# Patient Record
Sex: MALE | NOT HISPANIC OR LATINO | Employment: FULL TIME | URBAN - METROPOLITAN AREA
[De-identification: names, ages, dates, MRNs, and addresses within clinical notes are randomized per-mention and may not be internally consistent; named-entity substitution may affect disease eponyms.]

---

## 2023-04-14 ENCOUNTER — TELEPHONE (OUTPATIENT)
Dept: NEUROSURGERY | Facility: CLINIC | Age: 58
End: 2023-04-14
Payer: COMMERCIAL

## 2023-04-14 NOTE — TELEPHONE ENCOUNTER
Received [facility] referral, awaiting imaging and medical records.   PCP letter faxed.    Graciela Toscano, RN, CCM, CMSRN  RN Navigator  [facility]

## 2023-04-24 ENCOUNTER — TELEPHONE (OUTPATIENT)
Dept: NEUROSURGERY | Facility: CLINIC | Age: 58
End: 2023-04-24
Payer: COMMERCIAL

## 2023-04-24 NOTE — TELEPHONE ENCOUNTER
CLARISSA- spoke to patient and scheduled spine screen for 5/2 at 2pm. Will review records. PCP agreement still pending.    Graciela Toscano, RN, CCM, CMSRN  RN Navigator  [facility]

## 2023-05-02 ENCOUNTER — TELEPHONE (OUTPATIENT)
Dept: NEUROSURGERY | Facility: CLINIC | Age: 58
End: 2023-05-02
Payer: COMMERCIAL

## 2023-05-02 ENCOUNTER — PATIENT MESSAGE (OUTPATIENT)
Dept: NEUROSURGERY | Facility: CLINIC | Age: 58
End: 2023-05-02
Payer: COMMERCIAL

## 2023-05-02 RX ORDER — SACUBITRIL AND VALSARTAN 24; 26 MG/1; MG/1
1 TABLET, FILM COATED ORAL 2 TIMES DAILY
COMMUNITY
Start: 2023-04-22

## 2023-05-02 RX ORDER — CARVEDILOL 12.5 MG/1
12.5 TABLET ORAL 2 TIMES DAILY
COMMUNITY
Start: 2023-03-22

## 2023-05-02 RX ORDER — FUROSEMIDE 20 MG/1
20 TABLET ORAL 2 TIMES DAILY
COMMUNITY
Start: 2023-03-12

## 2023-05-02 NOTE — TELEPHONE ENCOUNTER
"Spoke to patient on:    Patient works at [employer]  Are you currently working? : Yes  - standing and walking- not a lot of heavy lifting  Are you on workers comp?No   Are you involved in any ligation at this time? No   Do you have HSA insurance? No                Have support to help with care and appointments: No   Travel Caregiver:         What is your chief complaint? injured back about 15 years ago- had steroid injections- about a year ago my sciatic nerve - left side started flaring up- OK when sitting or     Mom has implant in back and has Degenerative disk disease- both mother and father    How long has it been going on? year and half- last 6 months    Have you had pain like this before?No     Have you sought treatment for this condition in the past?Yes- JACK 10 years ago   If yes, what treatments did you then?  If yes, when did those stop working?    Where is the pain the worst?     Does the pain radiate?  left buttock- down leg side to knee down outside of leg to ankle (stops a foot)    Where else do you hurt? left buttock down leg    Is the pain constant or does it go away to a 0/10 at times even if the pain comes right back? starts at a 7     What makes the pain worse? standing or walking    What makes the pain better or decreases the pain (which position is least uncomfortable)? sitting down    Any other symptoms? no    Any bowel or bladder incontinence? no  Or changes?    What treatments have you tried for your current pain, and did they help?   Physical therapy? no   Chiropractor? yes- recent- didn' help   Injections? What kinds- JACK 10 years ago - it was lower back   Prior back surgery? no   Medication? tried tylenol or ibuprofen- uses lidocaine patch      BMI: 24.4  Ht: 6'0"  Weight: 180 pounds    AIC: No    Social Hx:     -  Nicotine no   - Alcohol yes- 1 drink a day   - Other substances no              Allergies:   Review of patient's allergies indicates:  No Known " Allergies      Medication list:  Current Outpatient Medications on File Prior to Visit   Medication Sig Dispense Refill    carvediloL (COREG) 12.5 MG tablet Take 12.5 mg by mouth 2 (two) times daily.      ENTRESTO 24-26 mg per tablet Take 1 tablet by mouth 2 (two) times daily.      furosemide (LASIX) 20 MG tablet Take 20 mg by mouth 2 (two) times daily.       No current facility-administered medications on file prior to visit.            Covid-19:   - vaccine: yes   - diagnosed with: no      Health Hx:  MARLEN- uses CPAP everyday  Cardiomyopathy    Surgical Hx:   No surgeries    Can you take one flight of stairs: yes    RCRI:   - Coronary Artery Disease: yes- cardiomyapathy   - Congestive Heart Failure: no   - Cerebrovascular Disease: no   - Diabetes Mellitus on insulin: no        STOPBANG   - S  snore loudly yes   - T  tired during the day no   - O  stop breathing in sleep yes   - P  BP/HTN yes   - B  BMI    - A  over 50 Yes and No   - N  neck size   - G  male gender Yes    Uses CPAP daily    Images Received:  MRI Yes    Type: Lumbar MRI 3/23- uploaded in EPIC  Xray Yes   Type:  EMG  No       Surgery recommended: No - has NOT seen a surgeon    PCP Information: Dr. Karen Montoya ph [phone]/fax [phone]- faxed - have not received yet.    Spoke with pt, reviewed history, discussed timeframe and expectation regarding the [facility] program, discussed logging in to the [facility] portal and to expect link to complete online seminar, confirmed PCP. Explained I would be sending imaging, chart review and spine screen assessment to Dr. Cuello/Cynthia then to Dr. Lanza to see if any additional orders are needed other than CXR and non bundled labs and I would reach back out when this information if obtained.  Explained that initial trip is an evaluation visit and if surgery needed and determined a surgery date would be determined with surgeon and patient. From there, I would send preop orders to PCP for them to  complete at home.   Patient verbalized understanding of the above and instructed to reach out with any questions or concerns. Direct phone # given and explained the use of patient portal communication.      Once PCP letter received, can schedule initial evaluation.    Graciela Toscano, RN, CMSRN  RN Navigator  [facility]

## 2023-05-03 ENCOUNTER — TELEPHONE (OUTPATIENT)
Dept: NEUROSURGERY | Facility: CLINIC | Age: 58
End: 2023-05-03
Payer: COMMERCIAL

## 2023-05-03 NOTE — TELEPHONE ENCOUNTER
Received call from patient that his primary care was hesitant about signing PCP agreement. Called Dr. Karen Montoya and spoke to her RN and explained [facility] program and PCP requirements. She stated Dr. Spivey was going to decline to sign form. Asked her to fax back. Will email [facility] to assist patient in finding a PCP to agree to the program.    Graciela Toscano, RN, CMSRN  RN Navigator  [facility]

## 2023-05-10 ENCOUNTER — PATIENT MESSAGE (OUTPATIENT)
Dept: NEUROSURGERY | Facility: CLINIC | Age: 58
End: 2023-05-10
Payer: COMMERCIAL

## 2023-05-24 ENCOUNTER — PATIENT MESSAGE (OUTPATIENT)
Dept: NEUROSURGERY | Facility: CLINIC | Age: 58
End: 2023-05-24
Payer: COMMERCIAL

## 2023-06-01 ENCOUNTER — PATIENT MESSAGE (OUTPATIENT)
Dept: NEUROSURGERY | Facility: CLINIC | Age: 58
End: 2023-06-01
Payer: COMMERCIAL

## 2023-06-05 ENCOUNTER — TELEPHONE (OUTPATIENT)
Dept: NEUROSURGERY | Facility: CLINIC | Age: 58
End: 2023-06-05
Payer: COMMERCIAL

## 2023-06-05 DIAGNOSIS — M54.9 DORSALGIA: Primary | ICD-10-CM

## 2023-06-23 ENCOUNTER — TELEPHONE (OUTPATIENT)
Dept: PAIN MEDICINE | Facility: CLINIC | Age: 58
End: 2023-06-23
Payer: COMMERCIAL

## 2023-06-23 NOTE — PROGRESS NOTES
Subjective:     Patient ID: Derick Conn is a 58 y.o. male.    Chief Complaint: No chief complaint on file.    Mr Conn is a 59 yo male CL[facility] patient here for evaluation of low back and left leg pain.  The pain has been present for a couple of years but worse since 10/2022.  The pain has progressively gotten worse.  The pain is worse in the left outer thigh.  The pain is not constant.  It is better with sitting and squatting.  The pain does not go away the longer he is on his feet.  The pain is worse with standing and walking.  The pain is a stabbing pain.  The pain is 3/10 now, worst 10/10 end of the day after standing all day, best 0/10 sitting in morning.  He has not been to PT for his back.  He has had injections but greater than 10 years ago.  He has not had recent injections.  He takes tylenol 2-3 pills a couple of times a week.  He uses a tens machine, OTC patches. He saw a chiropractor a couple fo visits with no relief    MRI lumbar 3/1/2023  SEGMENTATION: For the purposes of this dictation, it is assumed that  there are five fully segmented, non-rib-bearing lumbar-type vertebral  bodies, the lowest of which is labeled L5. Tip of the conus medullaris  is normal in position and configuration at T12-L1. The distal spinal  cord is normal in signal.  ALIGNMENT: There is a slight convex left curvature of the lumbar  spine. There is 2 mm retrolisthesis of L3 on L4, 3 mm retrolisthesis  of L4 on L5, and 2 mm retrolisthesis of L5 on S1.  BONE: Lumbar vertebral bodies are normal in height. There is no acute  fracture. There is moderate loss of disc height and disc desiccation  at L3-L4, L4-L5, and L5-S1. There is mild endplate marrow edema at  L3-L4, greatest on the right. There is faint endplate marrow edema  and mild fatty endplate changes at L4-L5 and there are mild to  moderate fatty endplate changes at L5-S1. These are likely stress  related.  EXTRA-AXIAL: There are no epidural collections.  OTHER: There is  no soft tissue abnormality in the limited images of  the abdomen and pelvis.  DEGENERATIVE:  L1-2: There is a mild disc bulge. There is mild bilateral facet  arthropathy. There is no central or neural foramina! narrowing.  L2-3: There is a mild disc bulge. There is mild bilateral facet  arthropathy. There is no central or neural foraminal narrowing.  L3-4: There is a moderate disc bulge in addition to the  retrolisthesis at this level. Endplate osteophytes are seen in the  neural foramen, right greater than left. There is mild bilateral  facet arthropathy. There is mild to moderate right and mild left  neural foraminal narrowing. There is mild to moderate central canal  stenosis.  L4-5: There is a moderate broad posterior disc osteophyte complex  which is asymmetric to the left in addition to the retrolisthesis at  this level. Endplate osteophytes are seen in the neural foramen  bilaterally. There is mild bilateral facet arthropathy. There is  mild to moderate left and mild right neural foraminal narrowing. There  is moderate central canal stenosis with effacement of the left lateral  recess and likely abutment and displacement of the left L5 nerve root.  L5-S1: There is a moderate broad posterior disc osteophyte complex.  Endplate osteophytes are seen in the neural foramen bilaterally.  There is mild to moderate left and mild right facet arthropathy.  There is moderate left and mild to moderate right neural foraminal  narrowing. There is abutment of the exiting left L5 nerve root and  questionable abutment of the right L5 nerve root There is no central  canal stenosis but There is slight narrowing of the left lateral  recess and questionable abutment of the left Si nerve root.  IMPRESSION:  1. Mild to moderate central canal stenosis with mild to moderate  right and mild left neural foraminal narrowing at L3-L4.  2. Moderate central canal stenosis with mild to moderate left and  mild right neural foramina! narrowing  at L4-L5. There is also  narrowing of the left lateral recess and abutment and likely  displacement of the left L5 nerve root.  3. Moderate left and mild to moderate right neural foraminal  narrowing at L5-S1. There is abutment of the exiting left L5 nerve  root and questionable abutment of the right L5 nerve root. There is  also slight narrowing of the left lateral recess and questionable  abutment of the left S1 nerve root.  4. Slight convex left curvature of the lumbar spine with multilevel  spondylolisthesis as above.  5. Mild endplate marrow edema at L3-L4, faint fatty endplate changes  at L4-L5, and mild to moderate fatty endplate changes at L5-S1 These  are likely stress related. There is no acute fracture.    No past medical history on file.    No past surgical history on file.    No family history on file.      Social History    Socioeconomic History      Marital status:     Tobacco Use      Smoking status: Former        Types: Cigarettes      Current Outpatient Medications:  carvediloL (COREG) 12.5 MG tablet, Take 12.5 mg by mouth 2 (two) times daily., Disp: , Rfl:   ENTRESTO 24-26 mg per tablet, Take 1 tablet by mouth 2 (two) times daily., Disp: , Rfl:   furosemide (LASIX) 20 MG tablet, Take 20 mg by mouth 2 (two) times daily., Disp: , Rfl:     No current facility-administered medications for this visit.      Review of patient's allergies indicates:  No Known Allergies      Review of Systems   Constitutional: Negative for weight gain and weight loss.   Cardiovascular:  Negative for chest pain.   Respiratory:  Negative for shortness of breath.    Musculoskeletal:  Positive for back pain. Negative for joint pain and joint swelling.   Gastrointestinal:  Negative for abdominal pain, bowel incontinence, nausea and vomiting.   Genitourinary:  Negative for bladder incontinence.   Neurological:  Positive for paresthesias (left foot toward heel). Negative for numbness.      Objective:     General: Derick lala  well-developed, well-nourished, appears stated age, in no acute distress, alert and oriented to time, place and person.     General    Vitals reviewed.  Constitutional: He is oriented to person, place, and time. He appears well-developed and well-nourished.   HENT:   Head: Normocephalic and atraumatic.   Pulmonary/Chest: Effort normal.   Neurological: He is alert and oriented to person, place, and time.   Psychiatric: He has a normal mood and affect. His behavior is normal. Judgment and thought content normal.     General Musculoskeletal Exam   Gait: normal     Right Ankle/Foot Exam     Tests   Heel Walk: able to perform  Tiptoe Walk: able to perform    Left Ankle/Foot Exam     Tests   Heel Walk: able to perform  Tiptoe Walk: able to perform  Back (L-Spine & T-Spine) / Neck (C-Spine) Exam     Back (L-Spine & T-Spine) Range of Motion   Extension:  30   Flexion:  90   Lateral bend right:  20   Lateral bend left:  20   Rotation right:  40   Rotation left:  40     Spinal Sensation   Right Side Sensation  C-Spine Level: normal   L-Spine Level: normal  S-Spine Level: normal  Left Side Sensation  C-Spine Level: normal  L-Spine Level: normal  S-Spine Level: normal    Back (L-Spine & T-Spine) Tests   Right Side Tests  Straight leg raise:        Sitting SLR: > 70 degrees    Left Side Tests  Straight leg raise:       Sitting SLR: > 70 degrees      Other   He has no scoliosis .  Spinal Kyphosis:  Absent      Muscle Strength   Right Upper Extremity   Biceps: 5/5   Deltoid:  5/5  Triceps:  5/5  Wrist extension: 5/5   Finger Flexors:  5/5  Left Upper Extremity  Biceps: 5/5   Deltoid:  5/5  Triceps:  5/5  Wrist extension: 5/5   Finger Flexors:  5/5  Right Lower Extremity   Hip Flexion: 5/5   Quadriceps:  5/5   Anterior tibial:  5/5   EHL:  5/5  Left Lower Extremity   Hip Flexion: 5/5   Quadriceps:  5/5   Anterior tibial:  5/5   EHL:  5/5    Reflexes     Left Side  Biceps:  2+  Triceps:  2+  Brachioradialis:  2+  Achilles:  2+  Left  Collins's Sign:  Absent  Babinski Sign:  absent  Quadriceps:  2+    Right Side   Biceps:  2+  Triceps:  2+  Brachioradialis:  2+  Achilles:  2+  Right Collins's Sign:  absent  Babinski Sign:  absent  Quadriceps:  2+    Vascular Exam     Right Pulses        Carotid:                  2+    Left Pulses        Carotid:                  2+        Assessment:     1. DDD (degenerative disc disease), lumbar    2. Lumbar radiculopathy         Plan:     Orders Placed This Encounter    [facility]     We discussed back pain and the nature of back pain.  We discussed that it is not one thing that causes the pain but an accumulation of multiple things that we do.  Pain is leg dominant.  MRI was reviewed, he has not had recent conservative care  We discussed posture sitting and the importance of trying to sit better.  We discussed sitting up and taking standing breaks.  His pain is more at end of day after standing all day  We discussed the benefits of therapy and exercise and continuing to move.  We discussed the importance of exercise and PT can help the pain and to keep doing exercises  Healthy back lumbar pattern 4 PT eval  Treat your own back book  Pain management recommended left L4/5 and L5/S1 TFESI       Follow-up: No follow-ups on file. If there are any questions prior to this, the patient was instructed to contact the office.

## 2023-06-23 NOTE — H&P (VIEW-ONLY)
Subjective:     Patient ID: Derick Conn is a 58 y.o. male.    Chief Complaint: No chief complaint on file.    Mr Conn is a 59 yo male [facility] patient here for evaluation of low back and left leg pain.  The pain has been present for a couple of years but worse since 10/2022.  The pain has progressively gotten worse.  The pain is worse in the left outer thigh.  The pain is not constant.  It is better with sitting and squatting.  The pain does not go away the longer he is on his feet.  The pain is worse with standing and walking.  The pain is a stabbing pain.  The pain is 3/10 now, worst 10/10 end of the day after standing all day, best 0/10 sitting in morning.  He has not been to PT for his back.  He has had injections but greater than 10 years ago.  He has not had recent injections.  He takes tylenol 2-3 pills a couple of times a week.  He uses a tens machine, OTC patches. He saw a chiropractor a couple fo visits with no relief    MRI lumbar 3/1/2023  SEGMENTATION: For the purposes of this dictation, it is assumed that  there are five fully segmented, non-rib-bearing lumbar-type vertebral  bodies, the lowest of which is labeled L5. Tip of the conus medullaris  is normal in position and configuration at T12-L1. The distal spinal  cord is normal in signal.  ALIGNMENT: There is a slight convex left curvature of the lumbar  spine. There is 2 mm retrolisthesis of L3 on L4, 3 mm retrolisthesis  of L4 on L5, and 2 mm retrolisthesis of L5 on S1.  BONE: Lumbar vertebral bodies are normal in height. There is no acute  fracture. There is moderate loss of disc height and disc desiccation  at L3-L4, L4-L5, and L5-S1. There is mild endplate marrow edema at  L3-L4, greatest on the right. There is faint endplate marrow edema  and mild fatty endplate changes at L4-L5 and there are mild to  moderate fatty endplate changes at L5-S1. These are likely stress  related.  EXTRA-AXIAL: There are no epidural collections.  OTHER: There is  no soft tissue abnormality in the limited images of  the abdomen and pelvis.  DEGENERATIVE:  L1-2: There is a mild disc bulge. There is mild bilateral facet  arthropathy. There is no central or neural foramina! narrowing.  L2-3: There is a mild disc bulge. There is mild bilateral facet  arthropathy. There is no central or neural foraminal narrowing.  L3-4: There is a moderate disc bulge in addition to the  retrolisthesis at this level. Endplate osteophytes are seen in the  neural foramen, right greater than left. There is mild bilateral  facet arthropathy. There is mild to moderate right and mild left  neural foraminal narrowing. There is mild to moderate central canal  stenosis.  L4-5: There is a moderate broad posterior disc osteophyte complex  which is asymmetric to the left in addition to the retrolisthesis at  this level. Endplate osteophytes are seen in the neural foramen  bilaterally. There is mild bilateral facet arthropathy. There is  mild to moderate left and mild right neural foraminal narrowing. There  is moderate central canal stenosis with effacement of the left lateral  recess and likely abutment and displacement of the left L5 nerve root.  L5-S1: There is a moderate broad posterior disc osteophyte complex.  Endplate osteophytes are seen in the neural foramen bilaterally.  There is mild to moderate left and mild right facet arthropathy.  There is moderate left and mild to moderate right neural foraminal  narrowing. There is abutment of the exiting left L5 nerve root and  questionable abutment of the right L5 nerve root There is no central  canal stenosis but There is slight narrowing of the left lateral  recess and questionable abutment of the left Si nerve root.  IMPRESSION:  1. Mild to moderate central canal stenosis with mild to moderate  right and mild left neural foraminal narrowing at L3-L4.  2. Moderate central canal stenosis with mild to moderate left and  mild right neural foramina! narrowing  at L4-L5. There is also  narrowing of the left lateral recess and abutment and likely  displacement of the left L5 nerve root.  3. Moderate left and mild to moderate right neural foraminal  narrowing at L5-S1. There is abutment of the exiting left L5 nerve  root and questionable abutment of the right L5 nerve root. There is  also slight narrowing of the left lateral recess and questionable  abutment of the left S1 nerve root.  4. Slight convex left curvature of the lumbar spine with multilevel  spondylolisthesis as above.  5. Mild endplate marrow edema at L3-L4, faint fatty endplate changes  at L4-L5, and mild to moderate fatty endplate changes at L5-S1 These  are likely stress related. There is no acute fracture.    No past medical history on file.    No past surgical history on file.    No family history on file.      Social History    Socioeconomic History      Marital status:     Tobacco Use      Smoking status: Former        Types: Cigarettes      Current Outpatient Medications:  carvediloL (COREG) 12.5 MG tablet, Take 12.5 mg by mouth 2 (two) times daily., Disp: , Rfl:   ENTRESTO 24-26 mg per tablet, Take 1 tablet by mouth 2 (two) times daily., Disp: , Rfl:   furosemide (LASIX) 20 MG tablet, Take 20 mg by mouth 2 (two) times daily., Disp: , Rfl:     No current facility-administered medications for this visit.      Review of patient's allergies indicates:  No Known Allergies      Review of Systems   Constitutional: Negative for weight gain and weight loss.   Cardiovascular:  Negative for chest pain.   Respiratory:  Negative for shortness of breath.    Musculoskeletal:  Positive for back pain. Negative for joint pain and joint swelling.   Gastrointestinal:  Negative for abdominal pain, bowel incontinence, nausea and vomiting.   Genitourinary:  Negative for bladder incontinence.   Neurological:  Positive for paresthesias (left foot toward heel). Negative for numbness.      Objective:     General: Derick lala  well-developed, well-nourished, appears stated age, in no acute distress, alert and oriented to time, place and person.     General    Vitals reviewed.  Constitutional: He is oriented to person, place, and time. He appears well-developed and well-nourished.   HENT:   Head: Normocephalic and atraumatic.   Pulmonary/Chest: Effort normal.   Neurological: He is alert and oriented to person, place, and time.   Psychiatric: He has a normal mood and affect. His behavior is normal. Judgment and thought content normal.     General Musculoskeletal Exam   Gait: normal     Right Ankle/Foot Exam     Tests   Heel Walk: able to perform  Tiptoe Walk: able to perform    Left Ankle/Foot Exam     Tests   Heel Walk: able to perform  Tiptoe Walk: able to perform  Back (L-Spine & T-Spine) / Neck (C-Spine) Exam     Back (L-Spine & T-Spine) Range of Motion   Extension:  30   Flexion:  90   Lateral bend right:  20   Lateral bend left:  20   Rotation right:  40   Rotation left:  40     Spinal Sensation   Right Side Sensation  C-Spine Level: normal   L-Spine Level: normal  S-Spine Level: normal  Left Side Sensation  C-Spine Level: normal  L-Spine Level: normal  S-Spine Level: normal    Back (L-Spine & T-Spine) Tests   Right Side Tests  Straight leg raise:        Sitting SLR: > 70 degrees    Left Side Tests  Straight leg raise:       Sitting SLR: > 70 degrees      Other   He has no scoliosis .  Spinal Kyphosis:  Absent      Muscle Strength   Right Upper Extremity   Biceps: 5/5   Deltoid:  5/5  Triceps:  5/5  Wrist extension: 5/5   Finger Flexors:  5/5  Left Upper Extremity  Biceps: 5/5   Deltoid:  5/5  Triceps:  5/5  Wrist extension: 5/5   Finger Flexors:  5/5  Right Lower Extremity   Hip Flexion: 5/5   Quadriceps:  5/5   Anterior tibial:  5/5   EHL:  5/5  Left Lower Extremity   Hip Flexion: 5/5   Quadriceps:  5/5   Anterior tibial:  5/5   EHL:  5/5    Reflexes     Left Side  Biceps:  2+  Triceps:  2+  Brachioradialis:  2+  Achilles:  2+  Left  Collins's Sign:  Absent  Babinski Sign:  absent  Quadriceps:  2+    Right Side   Biceps:  2+  Triceps:  2+  Brachioradialis:  2+  Achilles:  2+  Right Collins's Sign:  absent  Babinski Sign:  absent  Quadriceps:  2+    Vascular Exam     Right Pulses        Carotid:                  2+    Left Pulses        Carotid:                  2+        Assessment:     1. DDD (degenerative disc disease), lumbar    2. Lumbar radiculopathy         Plan:     Orders Placed This Encounter    [facility]     We discussed back pain and the nature of back pain.  We discussed that it is not one thing that causes the pain but an accumulation of multiple things that we do.  Pain is leg dominant.  MRI was reviewed, he has not had recent conservative care  We discussed posture sitting and the importance of trying to sit better.  We discussed sitting up and taking standing breaks.  His pain is more at end of day after standing all day  We discussed the benefits of therapy and exercise and continuing to move.  We discussed the importance of exercise and PT can help the pain and to keep doing exercises  Healthy back lumbar pattern 4 PT eval  Treat your own back book  Pain management recommended left L4/5 and L5/S1 TFESI       Follow-up: No follow-ups on file. If there are any questions prior to this, the patient was instructed to contact the office.

## 2023-06-26 ENCOUNTER — HOSPITAL ENCOUNTER (OUTPATIENT)
Dept: RADIOLOGY | Facility: OTHER | Age: 58
Discharge: HOME | End: 2023-06-26
Attending: NEUROLOGICAL SURGERY
Payer: COMMERCIAL

## 2023-06-26 ENCOUNTER — OFFICE VISIT (OUTPATIENT)
Dept: SPINE | Facility: CLINIC | Age: 58
End: 2023-06-26
Payer: COMMERCIAL

## 2023-06-26 ENCOUNTER — CLINICAL SUPPORT (OUTPATIENT)
Dept: REHABILITATION | Facility: OTHER | Age: 58
End: 2023-06-26
Attending: NEUROLOGICAL SURGERY
Payer: COMMERCIAL

## 2023-06-26 ENCOUNTER — OFFICE VISIT (OUTPATIENT)
Dept: SPINE | Facility: CLINIC | Age: 58
End: 2023-06-26
Attending: PHYSICAL MEDICINE & REHABILITATION
Payer: COMMERCIAL

## 2023-06-26 ENCOUNTER — OFFICE VISIT (OUTPATIENT)
Dept: PAIN MEDICINE | Facility: CLINIC | Age: 58
End: 2023-06-26
Attending: ANESTHESIOLOGY
Payer: COMMERCIAL

## 2023-06-26 VITALS
BODY MASS INDEX: 24.28 KG/M2 | TEMPERATURE: 98 F | WEIGHT: 179.25 LBS | RESPIRATION RATE: 19 BRPM | HEIGHT: 72 IN | HEART RATE: 63 BPM | SYSTOLIC BLOOD PRESSURE: 148 MMHG | DIASTOLIC BLOOD PRESSURE: 97 MMHG

## 2023-06-26 VITALS — HEART RATE: 110 BPM | SYSTOLIC BLOOD PRESSURE: 161 MMHG | DIASTOLIC BLOOD PRESSURE: 86 MMHG

## 2023-06-26 DIAGNOSIS — R29.898 DECREASED STRENGTH OF TRUNK AND BACK: ICD-10-CM

## 2023-06-26 DIAGNOSIS — M51.36 DDD (DEGENERATIVE DISC DISEASE), LUMBAR: ICD-10-CM

## 2023-06-26 DIAGNOSIS — M54.16 LUMBAR RADICULOPATHY: ICD-10-CM

## 2023-06-26 DIAGNOSIS — R29.3 POOR POSTURE: ICD-10-CM

## 2023-06-26 DIAGNOSIS — M48.062 SPINAL STENOSIS OF LUMBAR REGION WITH NEUROGENIC CLAUDICATION: Primary | ICD-10-CM

## 2023-06-26 DIAGNOSIS — M54.9 DORSALGIA: ICD-10-CM

## 2023-06-26 DIAGNOSIS — M51.36 DDD (DEGENERATIVE DISC DISEASE), LUMBAR: Primary | ICD-10-CM

## 2023-06-26 DIAGNOSIS — M54.16 LUMBAR RADICULOPATHY: Primary | ICD-10-CM

## 2023-06-26 PROCEDURE — 99999 PR PBB SHADOW E&M-EST. PATIENT-LVL III: ICD-10-PCS | Mod: PBBFAC,COE,, | Performed by: NEUROLOGICAL SURGERY

## 2023-06-26 PROCEDURE — 99999 PR PBB SHADOW E&M-EST. PATIENT-LVL III: CPT | Mod: PBBFAC,COE,, | Performed by: ANESTHESIOLOGY

## 2023-06-26 PROCEDURE — 72110 X-RAY EXAM L-2 SPINE 4/>VWS: CPT | Mod: TC,FY

## 2023-06-26 PROCEDURE — 97161 PT EVAL LOW COMPLEX 20 MIN: CPT

## 2023-06-26 PROCEDURE — 99203 OFFICE O/P NEW LOW 30 MIN: CPT | Mod: S$GLB,COE,, | Performed by: PHYSICAL MEDICINE & REHABILITATION

## 2023-06-26 PROCEDURE — 99203 PR OFFICE/OUTPT VISIT, NEW, LEVL III, 30-44 MIN: ICD-10-PCS | Mod: S$GLB,COE,, | Performed by: PHYSICAL MEDICINE & REHABILITATION

## 2023-06-26 PROCEDURE — 97110 THERAPEUTIC EXERCISES: CPT

## 2023-06-26 PROCEDURE — 99999 PR PBB SHADOW E&M-EST. PATIENT-LVL II: CPT | Mod: PBBFAC,COE,, | Performed by: PHYSICAL MEDICINE & REHABILITATION

## 2023-06-26 PROCEDURE — 99999 PR PBB SHADOW E&M-EST. PATIENT-LVL III: CPT | Mod: PBBFAC,COE,, | Performed by: NEUROLOGICAL SURGERY

## 2023-06-26 PROCEDURE — 99999 PR PBB SHADOW E&M-EST. PATIENT-LVL II: ICD-10-PCS | Mod: PBBFAC,COE,, | Performed by: PHYSICAL MEDICINE & REHABILITATION

## 2023-06-26 PROCEDURE — 99203 OFFICE O/P NEW LOW 30 MIN: CPT | Mod: S$GLB,COE,, | Performed by: NEUROLOGICAL SURGERY

## 2023-06-26 PROCEDURE — 99204 PR OFFICE/OUTPT VISIT, NEW, LEVL IV, 45-59 MIN: ICD-10-PCS | Mod: S$GLB,COE,, | Performed by: ANESTHESIOLOGY

## 2023-06-26 PROCEDURE — 99204 OFFICE O/P NEW MOD 45 MIN: CPT | Mod: S$GLB,COE,, | Performed by: ANESTHESIOLOGY

## 2023-06-26 PROCEDURE — 99203 PR OFFICE/OUTPT VISIT, NEW, LEVL III, 30-44 MIN: ICD-10-PCS | Mod: S$GLB,COE,, | Performed by: NEUROLOGICAL SURGERY

## 2023-06-26 PROCEDURE — 99999 PR PBB SHADOW E&M-EST. PATIENT-LVL III: ICD-10-PCS | Mod: PBBFAC,COE,, | Performed by: ANESTHESIOLOGY

## 2023-06-26 PROCEDURE — 72110 XR LUMBAR SPINE AP AND LAT WITH FLEX/EXT: ICD-10-PCS | Mod: 26,COE,, | Performed by: RADIOLOGY

## 2023-06-26 PROCEDURE — 72110 X-RAY EXAM L-2 SPINE 4/>VWS: CPT | Mod: 26,COE,, | Performed by: RADIOLOGY

## 2023-06-26 NOTE — PROGRESS NOTES
"CHIEF COMPLAINT:  Consolation with possible surgical intervention    I, Nuvia Ortez, attest that this documentation has been prepared under the direction and in the presence of Atul Cuello MD.    HPI:  Derick Conn is a 58 y.o.  male with PMHx of MARLEN and cardiomyopathy, who presents to clinic today for consultation of possible surgical intervention. He is a [facility] [place]. Patient reports injured back about 15 years ago, has had conservative treatment with steroid injections, and noted flaring up of left sided sciatic nerve pain 1 year ago. He reports his pain radiates to his left buttock, down his leg side to knee and to outside of leg to ankle. He described his pain as a 7/10 worse with ambulation or standing, and alleviation of pain when sitting. He denied b/b dysfunction. He endorsed daily EtOH usage.     Today the patient reports continued right leg pain since October with lower back pain that he described as a "stiffness" that is worsened with standing and walking. He states his symptoms are better when he leans forward. Denies bladder dysfunction or balance problems. He reports that he has not sought conservative treatment. He notes that he is usually on his feet for 12 hours a day.         Review of patient's allergies indicates:  No Known Allergies    No past medical history on file.  No past surgical history on file.  No family history on file.  Social History     Tobacco Use    Smoking status: Former     Types: Cigarettes        Review of Systems   Constitutional: Negative.    HENT: Negative.     Eyes: Negative.    Respiratory: Negative.     Cardiovascular: Negative.    Gastrointestinal: Negative.    Endocrine: Negative.    Genitourinary: Negative.    Musculoskeletal:  Positive for back pain and myalgias (left leg). Negative for gait problem and neck pain.   Skin: Negative.    Allergic/Immunologic: Negative.    Neurological:  Negative for weakness, light-headedness, numbness and headaches. "   Hematological: Negative.    Psychiatric/Behavioral: Negative.     All other systems reviewed and are negative.    OBJECTIVE:   Vital Signs:  Pulse: (!) 115 (06/26/23 1042)  BP: (!) 161/86 (06/26/23 1042)    Physical Exam:    Vital signs: All nursing notes and vital signs reviewed -- afebrile, vital signs stable.  Constitutional: Patient sitting comfortably in chair. Appears well developed and well nourished.  Skin: Exposed areas are intact without abnormal markings, rashes or other lesions.  HEENT: Normocephalic. Normal conjunctivae.  Cardiovascular: Normal rate and regular rhythm.  Respiratory: Chest wall rises and falls symmetrically, without signs of respiratory distress.  Abdomen: Soft and non-tender.  Extremities: Warm and without edema. Calves supple, non-tender.  Psych/Behavior: Normal affect.    Neurological:    Mental status: Alert and oriented. Conversational and appropriate.       Cranial Nerves: VFF to confrontation. PERRL. EOMI without nystagmus. Facial STLT normal and symmetric. Strong, symmetric muscles of mastication. Facial strength full and symmetric. Hearing equal bilaterally to finger rub. Palate and uvula rise and fall normally in midline. Shoulder shrug 5/5 strength. Tongue midline.     Motor:    Upper:  Deltoids Triceps Biceps WE WF     R 5/5 5/5 5/5 5/5 5/5 5/5    L 5/5 5/5 5/5 5/5 5/5 5/5      Lower:  HF KE KF DF PF EHL    R 5/5 5/5 5/5 5/5 5/5 5/5    L 5/5 5/5 5/5 5/5 5/5 5/5     Sensory: Intact sensation to light touch in all extremities. Romberg negative.    Reflexes:          DTR: 2+ symmetrically throughout.     Collins's: Negative.     Babinski's: Negative.     Clonus: Negative.    Cerebellar: Finger-to-nose and rapid alternating movements normal. Gait stable, fluid.    Spine:    Posture: Head well aligned over pelvis in front and side views.  No focal or global spinal deformity visible on inspection. Shoulders and hips even. No obvious leg length discrepancy. No scapula  winging.    Bending: Full ROM with forward, back and lateral bending. No rib prominence with forward bend.    Cervical:      ROM: Full with flexion, extension, lateral rotation and ear-to-shoulder bend.      Midline TTP: Negative.     Spurling's test: Negative.     Lhermitte's: Negative.    Thoracic:     Midline TTP: Negative    Lumbar:     Midline TTP: Negative     Straight Leg Test: Negative     Crossed Straight Leg Test: Negative     Sciatic notch tenderness: Negative.    Other:     SI joint TTP: Negative.     Greater trochanter TTP: Negative.     Tenderness with external/internal hip rotation: Negative.    Diagnostic Results:  All imaging was independently reviewed by me.    X-Ray Lumbar Spine With Flex-Ext dated 6/26/23  No instability    MRI Lumbar Spine dated 4/19/23  Degenerative disc at L3/4, L4/5, L5/S1 with severe nerve foraminal stenosis left more than right     ASSESSMENT/PLAN:     Derick Conn has degenerative disc disease at L3-4 through L5-S1 with multilevel foraminal stenosis, causing axial low back pain and neurogenic claudication. He may be a surgical candidate down the road but first needs to try nonsurgical things like PT and injections. Any future surgical reevaluation should include a scoli xray to see if fusion would be needed.    The patient understands and agrees with the plan of care. All questions were answered.     1. PT and PM      I, Dr. Atul Cuello personally performed the services described in this documentation. All medical record entries made by the scribe, Nuvia Ortez, were at my direction and in my presence.  I have reviewed the chart and agree that the record reflects my personal performance and is accurate and complete.      Atul Cuello M.D.  Department of Neurosurgery  [facility]

## 2023-06-26 NOTE — PROGRESS NOTES
Chronic Pain - New Consult    Referring Physician: Atul Cuello MD    Chief Complaint:   Chief Complaint   Patient presents with    Low-back Pain     SUBJECTIVE:    Derick Conn presents to the clinic for the evaluation of low back pain radiating into the left leg pain.  He has had low back pain for many years, but his current symptoms started about 1 year ago, and symptoms have been worsening.The pain is located in the low back and radiates down the posterior lateral aspect of his left leg.  The pain is described as sharp and shooting and is rated as 4/10. The pain is rated with a score of  4/10 on the BEST day and a score of 10/10 on the WORST day.  Symptoms interfere with daily activity, sleeping, and work. The pain is exacerbated by Standing, Walking, and Night Time.  The pain is mitigated by rest, sitting, and TENS.     Patient denies night fever/night sweats, urinary incontinence, bowel incontinence, significant weight loss, significant motor weakness, and loss of sensations.    Physical Therapy/Home Exercise: yes      Pain Disability Index Review:  Last 3 PDI Scores 6/26/2023   Pain Disability Index (PDI) 37     Pain Medications:    - Adjuvant Medications: Mobic (Meloxicam)     report:  Not applicable    Pain Procedures:   Prior epidural injections approx. 10 years ago that were helpful    Imaging:   MRI L-spine - 3/1/23        No past medical history on file.  No past surgical history on file.  Social History     Socioeconomic History    Marital status:      No family history on file.    Review of patient's allergies indicates:  No Known Allergies    Current Outpatient Medications   Medication Sig    carvediloL (COREG) 12.5 MG tablet Take 12.5 mg by mouth 2 (two) times daily.    ENTRESTO 24-26 mg per tablet Take 1 tablet by mouth 2 (two) times daily.    furosemide (LASIX) 20 MG tablet Take 20 mg by mouth 2 (two) times daily.     No current facility-administered medications for this visit.        REVIEW OF SYSTEMS:    GENERAL:  No weight loss, malaise or fevers.  HEENT:  Negative for frequent or significant headaches.  NECK:  Negative for lumps, goiter, pain and significant neck swelling.  RESPIRATORY:  Negative for cough, wheezing or shortness of breath.  CARDIOVASCULAR:  Negative for chest pain, leg swelling or palpitations.  GI:  Negative for abdominal discomfort, blood in stools or black stools or change in bowel habits.  MUSCULOSKELETAL:  See HPI.  SKIN:  Negative for lesions, rash, and itching.  PSYCH:  Negative for sleep disturbance, mood disorder and recent psychosocial stressors.  HEMATOLOGY/LYMPHOLOGY:  Negative for prolonged bleeding, bruising easily or swollen nodes.  NEURO:   No history of headaches, syncope, paralysis, seizures or tremors.  All other reviewed and negative other than HPI.    OBJECTIVE:    BP (!) 148/97 (BP Location: Right arm, Patient Position: Sitting)   Pulse 63   Temp 97.5 °F (36.4 °C) (Oral)   Resp 19   Ht 6' (1.829 m)   Wt 81.3 kg (179 lb 3.7 oz)   BMI 24.31 kg/m²     PHYSICAL EXAMINATION:    General appearance: Well appearing, in no acute distress, alert and oriented x3.  Psych:  Mood and affect appropriate.  Skin: Skin color, texture, turgor normal, no rashes or lesions, in both upper and lower body.  Head/face:  Normocephalic, atraumatic. No palpable lymph nodes.  Cor: RRR  Pulm:  Symmetric chest rise.  Nonlabored breathing.  GI:  Nondistended  Back: Straight leg raising in the sitting and supine positions is +ve to radicular pain. No no focal tenderness to palpation of the spine or costovertebral angles. Normal range of motion.  Patient does report some discomfort with terminal extension.  Extremities: Peripheral joint ROM is full and pain free without obvious instability or laxity in all four extremities. No deformities, edema, or skin discoloration. Good capillary refill.  Musculoskeletal:  HOLA negative.  FADIR negative.  Bilateral upper and lower  extremity strength is normal and symmetric.  No atrophy or tone abnormalities are noted.  Neuro: Bilateral upper and lower extremity coordination and muscle stretch reflexes are physiologic and symmetric.  No clonus.  No loss of sensation is noted.  Gait: normal.    ASSESSMENT: 58 y.o. year old male with low back pain, consistent with      1. DDD (degenerative disc disease), lumbar  Procedure Order to Pain Management      2. Lumbar radiculopathy  Procedure Order to Pain Management        PLAN:     - I have stressed the importance of physical activity and a home exercise plan to help with pain and improve health.  - Patient can continue with medications for now since they are providing benefits, using them appropriately, and without side effects.  - Will schedule for left L4/5 and L5/S1 TFESI tomorrow  - Would recommend trial of gabapentin for nerve pain  - Continue TENS unit as this has been helpful for him  - Counseled patient regarding the importance of activity modification, constant sleeping habits, and physical therapy.      The above plan and management options were discussed at length with patient. Patient is in agreement with the above and verbalized understanding. It will be communicated with the referring physician via electronic record, fax, or mail.    Ryne Wang MD  [facility], PGY-4   I have personally reviewed the history and exam of this patient and agree with the resident/fellow/NPs note as stated above.    Abhijeet Gold MD  6/26/23

## 2023-06-27 ENCOUNTER — PATIENT MESSAGE (OUTPATIENT)
Dept: ADMINISTRATIVE | Facility: OTHER | Age: 58
End: 2023-06-27
Payer: COMMERCIAL

## 2023-06-27 ENCOUNTER — HOSPITAL ENCOUNTER (OUTPATIENT)
Facility: OTHER | Age: 58
Discharge: HOME | End: 2023-06-27
Attending: ANESTHESIOLOGY | Admitting: STUDENT IN AN ORGANIZED HEALTH CARE EDUCATION/TRAINING PROGRAM
Payer: COMMERCIAL

## 2023-06-27 VITALS
BODY MASS INDEX: 24.38 KG/M2 | TEMPERATURE: 98 F | HEART RATE: 64 BPM | OXYGEN SATURATION: 98 % | HEIGHT: 72 IN | SYSTOLIC BLOOD PRESSURE: 159 MMHG | WEIGHT: 180 LBS | DIASTOLIC BLOOD PRESSURE: 98 MMHG | RESPIRATION RATE: 16 BRPM

## 2023-06-27 DIAGNOSIS — G89.29 CHRONIC PAIN: ICD-10-CM

## 2023-06-27 DIAGNOSIS — M54.16 LUMBAR RADICULOPATHY: Primary | ICD-10-CM

## 2023-06-27 PROCEDURE — 64484 PRA INJECT ANES/STEROID FORAMEN LUMBAR/SACRAL W IMG GUIDE ,EA ADD LEVEL: ICD-10-PCS | Mod: 50,COE,, | Performed by: ANESTHESIOLOGY

## 2023-06-27 PROCEDURE — 64483 PR EPIDURAL INJ, ANES/STEROID, TRANSFORAMINAL, LUMB/SACR, SNGL LEVL: ICD-10-PCS | Mod: 50,COE,, | Performed by: ANESTHESIOLOGY

## 2023-06-27 PROCEDURE — 25000003 PHARM REV CODE 250: Performed by: ANESTHESIOLOGY

## 2023-06-27 PROCEDURE — 25000003 PHARM REV CODE 250: Performed by: STUDENT IN AN ORGANIZED HEALTH CARE EDUCATION/TRAINING PROGRAM

## 2023-06-27 PROCEDURE — 64484 NJX AA&/STRD TFRM EPI L/S EA: CPT | Mod: 50,COE,, | Performed by: ANESTHESIOLOGY

## 2023-06-27 PROCEDURE — 63600175 PHARM REV CODE 636 W HCPCS: Performed by: ANESTHESIOLOGY

## 2023-06-27 PROCEDURE — 25500020 PHARM REV CODE 255: Performed by: ANESTHESIOLOGY

## 2023-06-27 PROCEDURE — 64484 NJX AA&/STRD TFRM EPI L/S EA: CPT | Mod: LT | Performed by: ANESTHESIOLOGY

## 2023-06-27 PROCEDURE — 64483 NJX AA&/STRD TFRM EPI L/S 1: CPT | Mod: 50,COE,, | Performed by: ANESTHESIOLOGY

## 2023-06-27 PROCEDURE — 64483 NJX AA&/STRD TFRM EPI L/S 1: CPT | Mod: RT | Performed by: ANESTHESIOLOGY

## 2023-06-27 RX ORDER — LIDOCAINE HYDROCHLORIDE 20 MG/ML
INJECTION, SOLUTION INFILTRATION; PERINEURAL
Status: DISCONTINUED | OUTPATIENT
Start: 2023-06-27 | End: 2023-06-27 | Stop reason: HOSPADM

## 2023-06-27 RX ORDER — DEXAMETHASONE SODIUM PHOSPHATE 10 MG/ML
INJECTION INTRAMUSCULAR; INTRAVENOUS
Status: DISCONTINUED | OUTPATIENT
Start: 2023-06-27 | End: 2023-06-27 | Stop reason: HOSPADM

## 2023-06-27 RX ORDER — SODIUM CHLORIDE 9 MG/ML
INJECTION, SOLUTION INTRAVENOUS CONTINUOUS
Status: DISCONTINUED | OUTPATIENT
Start: 2023-06-27 | End: 2023-06-27 | Stop reason: HOSPADM

## 2023-06-27 RX ORDER — LIDOCAINE HYDROCHLORIDE 10 MG/ML
INJECTION, SOLUTION EPIDURAL; INFILTRATION; INTRACAUDAL; PERINEURAL
Status: DISCONTINUED | OUTPATIENT
Start: 2023-06-27 | End: 2023-06-27 | Stop reason: HOSPADM

## 2023-06-27 NOTE — DISCHARGE SUMMARY
Discharge Note  Short Stay      SUMMARY     Admit Date: 6/27/2023    Attending Physician: Koby Mei      Discharge Physician: Koby Mei      Discharge Date: 6/27/2023 1:42 PM    Procedure(s) (LRB):  LUMBAR TRANSFORAMINAL LEFT L4/5 AND L5/S1 ECEN / CLARISSA PT (Left)    Final Diagnosis: Lumbar radiculopathy [M54.16]  DDD (degenerative disc disease), lumbar [M51.36]    Disposition: Home or self care    Patient Instructions:   Current Discharge Medication List        CONTINUE these medications which have NOT CHANGED    Details   carvediloL (COREG) 12.5 MG tablet Take 12.5 mg by mouth 2 (two) times daily.      ENTRESTO 24-26 mg per tablet Take 1 tablet by mouth 2 (two) times daily.      furosemide (LASIX) 20 MG tablet Take 20 mg by mouth 2 (two) times daily.                 Discharge Diagnosis: Lumbar radiculopathy [M54.16]  DDD (degenerative disc disease), lumbar [M51.36]  Condition on Discharge: Stable with no complications to procedure   Diet on Discharge: Same as before.  Activity: as per instruction sheet.  Discharge to: Home with a responsible adult.  Follow up: 2-4 weeks

## 2023-06-27 NOTE — DISCHARGE INSTRUCTIONS

## 2023-06-27 NOTE — OP NOTE
Lumbar Transforaminal Epidural Steroid Injection under Fluoroscopic Guidance    The procedure, risks, benefits, and options were discussed with the patient. There are no contraindications to the procedure. The patent expressed understanding and agreed to the procedure. Informed written consent was obtained prior to the start of the procedure and can be found in the patient's chart.    PATIENT NAME: Derick Conn   MRN: [id number]     DATE OF PROCEDURE: 06/27/2023    PROCEDURE:  Left  L4/5 and L5/S1 Lumbar Transforaminal Epidural Steroid Injection under Fluoroscopic Guidance    PRE-OP DIAGNOSIS: Lumbar radiculopathy [M54.16]  DDD (degenerative disc disease), lumbar [M51.36] Lumbar radiculopathy [M54.16]    POST-OP DIAGNOSIS: Same    PHYSICIAN: Angelina Rea MD    ASSISTANTS: Anitha Del Valle MD     MEDICATIONS INJECTED: Preservative-free Decadron 10mg with 5cc of Lidocaine 1% MPF     LOCAL ANESTHETIC INJECTED: Xylocaine 2%     SEDATION: None    ESTIMATED BLOOD LOSS: None    COMPLICATIONS: None    TECHNIQUE: Time-out was performed to identify the patient and procedure to be performed. With the patient laying in a prone position, the surgical area was prepped and draped in the usual sterile fashion using ChloraPrep and a fenestrated drape.The levels were determined under fluoroscopy guidance. Skin anesthesia was achieved by injecting Lidocaine 2% over the injection sites. The transforaminal spaces were then approached with a 25 gauge, 3.5 inch spinal quinke needle that was introduced under fluoroscopic guidance in the AP and Lateral views. Once the needle tip was in the area of the transforaminal space, and there was no blood, CSF or paraesthesias, contrast dye Omnipaque (300mg/mL) was injected to confirm placement and there was no vascular runoff. Fluoroscopic imaging in the AP and lateral views revealed a clear outline of the spinal nerve with proximal spread of agent through the neural foramen into the epidural  space. 3 mL of the medication mixture listed above was injected slowly at each site. Displacement of the radio opaque contrast after injection of the medication confirmed that the medication went into the area of the transforaminal spaces. The needles were removed and bleeding was nil. A sterile dressing was applied. No specimens collected. The patient tolerated the procedure well.       The patient was monitored after the procedure in the recovery area. They were given post-procedure and discharge instructions to follow at home. The patient was discharged in a stable condition.    Koby Mei MD  [facility] Pain Fellow      I reviewed and edited the fellow's note. I conducted my own interview and physical examination. I agree with the findings. I was present and supervising all critical portions of the procedure.    Angelina Rea MD

## 2023-06-29 ENCOUNTER — TELEPHONE (OUTPATIENT)
Dept: SPINE | Facility: CLINIC | Age: 58
End: 2023-06-29
Payer: COMMERCIAL

## 2023-06-29 ENCOUNTER — PATIENT MESSAGE (OUTPATIENT)
Dept: ADMINISTRATIVE | Facility: OTHER | Age: 58
End: 2023-06-29
Payer: COMMERCIAL

## 2023-06-29 NOTE — TELEPHONE ENCOUNTER
CLARISSA patient was at [facility] for Spine evaluation. No surgery indicated. Patient received pain injection. Recs for Pain management and physical therapy. Sent all clinials notes to patient's PCP Dr. Prudence Aguilar ph [phone]/fax [phone] and sent all clinicals to [facility] along with virtual PT referral.    all appointments linked and bundle closed.    Graciela Toscano, RN, CMSRN  RN Navigator  [facility]

## 2023-07-07 PROBLEM — R29.898 DECREASED STRENGTH OF TRUNK AND BACK: Status: ACTIVE | Noted: 2023-07-07

## 2023-07-07 PROBLEM — R29.3 POOR POSTURE: Status: ACTIVE | Noted: 2023-07-07

## 2023-07-08 NOTE — PLAN OF CARE
[facility]- PHYSICAL THERAPY EVALUATION - [facility] [place]     Name: Derick Conn  Clinic Number: [id number]    Therapy Diagnosis:   Encounter Diagnoses   Name Primary?    Poor posture     Decreased strength of trunk and back        Physician: Atul Cuello MD    Physician Orders: PT Eval and Treat   Medical Diagnosis from Referral:   M51.36 (ICD-10-CM) - DDD (degenerative disc disease), lumbar   M54.16 (ICD-10-CM) - Lumbar radiculopathy     Evaluation Date: 6/26/2023  Authorization Period Expiration: 06/25/24  Plan of Care Expiration: 1 visit for [facility] [place]  Visit # / Visits authorized: 1/ 1    Time In: 2:00 pm  Time Out: 3:00 pm   Total Billable Time: 60 minutes    Precautions: Standard    Pattern of pain determined: 1PEP       Subjective   Date of onset: chronic /c worsening over the year  including LLE sx   History of current condition - Derick reports: L LBP /c progression to lat L thigh and beyond the knee.  Patient note pain inconsistent presentation /c instant on/off   Pt deny N/T BLE except bottom L foot feels like falling asleep.  Patient report sx sig increase throughout the day especially /c extended standing at work.  Patient clarify some mornings having discomfort especially when they follow continuous work days.  Patient report he has adjusted his work plan to include more sitting.  Patient note relief /c sitting and sitting /c forward lean.  Patient note some relief /c TENS unit at end of day.Patient functional limitations inc as the day progresses leading to discomfort /c home chores, yard work.    Patient report scheduled for left L4/5 and L5/S1 TFESI tomorrow    Per MD report   Mr Conn is a 59 yo male [facility] [place] patient here for evaluation of low back and left leg pain.  The pain has been present for a couple of years but worse since 10/2022.  The pain has progressively gotten worse.  The pain is worse in the left outer thigh.  The pain is not constant.  It is better with sitting and squatting.  The pain does not go  away the longer he is on his feet.  The pain is worse with standing and walking.  The pain is a stabbing pain.  The pain is 3/10 now, worst 10/10 end of the day after standing all day, best 0/10 sitting in morning.  He has not been to PT for his back.  He has had injections but greater than 10 years ago.  He has not had recent injections.  He takes tylenol 2-3 pills a couple of times a week.  He uses a tens machine, OTC patches. He saw a chiropractor a couple fo visits with no relief        Medical History:   No past medical history on file.    Surgical History:   Derick Conn  has a past surgical history that includes Transforaminal epidural injection of steroid (Left, 6/27/2023).    Medications:   Derick has a current medication list which includes the following prescription(s): carvedilol, entresto, and furosemide.    Allergies:   Review of patient's allergies indicates:  No Known Allergies     Imaging:  Per MD report  MRI lumbar 3/1/2023  ALIGNMENT: There is a slight convex left curvature of the lumbar spine. There is 2 mm retrolisthesis of L3 on L4, 3 mm retrolisthesis of L4 on L5, and 2 mm retrolisthesis of L5 on S1.  BONE: Lumbar vertebral bodies are normal in height. There is no acute fracture. There is moderate loss of disc height and disc desiccation at L3-L4, L4-L5, and L5-S1. There is mild endplate marrow edema at L3-L4, greatest on the right. There is faint endplate marrow edema and mild fatty endplate changes at L4-L5 and there are mild to  moderate fatty endplate changes at L5-S1. These are likely stress related.  EXTRA-AXIAL: There are no epidural collections.  OTHER: There is no soft tissue abnormality in the limited images of  the abdomen and pelvis.  DEGENERATIVE:  L1-2: There is a mild disc bulge. There is mild bilateral facet arthropathy. There is no central or neural foramina! narrowing.  L2-3: There is a mild disc bulge. There is mild bilateral facet arthropathy. There is no central or neural  foraminal narrowing.  L3-4: There is a moderate disc bulge in addition to the retrolisthesis at this level. Endplate osteophytes are seen in the  neural foramen, right greater than left. There is mild bilateral facet arthropathy. There is mild to moderate right and mild left neural foraminal narrowing. There is mild to moderate central canal stenosis.  L4-5: There is a moderate broad posterior disc osteophyte complex which is asymmetric to the left in addition to the retrolisthesis at this level. Endplate osteophytes are seen in the neural foramen bilaterally. There is mild bilateral facet arthropathy. There is mild to moderate left and mild right neural foraminal narrowing. There is moderate central canal stenosis with effacement of the left lateral recess and likely abutment and displacement of the left L5 nerve root.  L5-S1: There is a moderate broad posterior disc osteophyte complex.  Endplate osteophytes are seen in the neural foramen bilaterally. There is mild to moderate left and mild right facet arthropathy. There is moderate left and mild to moderate right neural foraminal narrowing. There is abutment of the exiting left L5 nerve root and questionable abutment of the right L5 nerve root There is no central canal stenosis but There is slight narrowing of the left lateral recess and questionable abutment of the left Si nerve root.  IMPRESSION:  1. Mild to moderate central canal stenosis with mild to moderate right and mild left neural foraminal narrowing at L3-L4.  2. Moderate central canal stenosis with mild to moderate left and mild right neural foramina! narrowing at L4-L5. There is also narrowing of the left lateral recess and abutment and likely  displacement of the left L5 nerve root.  3. Moderate left and mild to moderate right neural foramina narrowing at L5-S1. There is abutment of the exiting left L5 nerve root and questionable abutment of the right L5 nerve root. There is also slight narrowing of  the left lateral recess and questionable abutment of the left S1 nerve root.  4. Slight convex left curvature of the lumbar spine with multilevel spondylolisthesis as above.  5. Mild endplate marrow edema at L3-L4, faint fatty endplate changes at L4-L5, and mild to moderate fatty endplate changes at L5-S1 These are likely stress related. There is no acute fracture.    EXAMINATION:  XR LUMBAR SPINE AP AND LAT WITH FLEX/EXT     FINDINGS:  No evidence of an acute fracture.  Alignment is maintained.  No instability.  There is intervertebral disc height loss at L3-4, L4-5, L5-S1.  Impression:  Degenerative change of the inferior lumbar spine without instability.        Electronically signed by: Danielito Yuan MD  Date:                                            06/26/2023  Time:                                           07:55        Prior Therapy: none  Prior Treatment: left L4/5 and L5/S1 TFESI  Social History: lives in house, 1 level, 2 BIBI lives with their spouse   Occupation:  [employer]/25 standing/sitting   Leisure: working in the yard, golfing        Prior Level of Function: golfing regularly (5 yrs ago), pain free during work  Current Level of Function: difficulty /c house hold chores especially at end of day, pain during work tasks  DME owned/used: No         Pain:  Current 1/10 no LLE sx , worst 10/10 /p long work week, best 1/10   Location: left back, L post/lat thigh  Description: LB - dull ache  LLE - sharp, shooting  Aggravating Factors: extended standing  Easing Factors: sitting, leaning forward   Disturbed Sleep: Y        Pattern of pain questions:  1.  Where is your pain the worst? LB  2.  Is your pain constant or intermittent? Constant   3.  Does bending forward make your typical pain worse? N  4.  Since the start of your back pain, has there been a change in your bowel or bladder? N  5.  What can't you do now that you use to be able to do? Go golfing, pain free workday, household chores  at end of day       Red Flag Screening:   Cough  Sneeze  Strain: (--)  Bladder/ bowel: (--)  Falls: (--)  Night pain: (--)  Unexplained weight loss: (--)      OBJECTIVE     Postural examination/scapula alignment: slight fwd head, rounded shoulders, dec lumbar lordosis  Sitting: slouched   Standing: dec lumbar lordosis  Correction of posture: better with lumbar roll  Palpation:  TTP B lumbar paraspinals    Mild TTP B piriformis     MOVEMENT LOSS    ROM Loss   Flexion minimal loss  HS tight    Extension minimal loss   Side bending Right minimal loss   Side bending Left minimal loss   Rotation Right within functional limits   Rotation Left minimal loss     Lower Extremity Strength  Right LE  Left LE    Hip flexion: 4+/5 Hip flexion: 4+/5   Hip extension:  4/5 Hip extension: 4/5   Hip abduction: 4/5 Hip abduction: 4/5   Hip adduction:  4+/5 Hip adduction:  4+/5   Hip Internal rotation   4/5 Hip Internal rotation 4/5   Knee Flexion 5/5 Knee Flexion 5/5   Knee Extension 5/5 Knee Extension 5/5   Ankle dorsiflexion: 5/5 Ankle dorsiflexion: 5/5   Ankle plantarflexion: 5/5 Ankle plantarflexion: 5/5       GAIT:  Assistive Device used: none  Level of Assistance: independent  Patient displays the following gait deviations:  no gait deviations observed.     Special Tests:   Test Name  Test Result   Prone Instability Test (--)   SI Joint Provocation Test (--)   Straight Leg Raise (--)   Neural Tension Test (--)   Crossed Straight Leg Raise (--)   Walking on toes (+)   Walking on heels  (+)   Additional Tests        NEUROLOGICAL SCREENING     Sensory deficit:     BLE LT intact  L5 myotome intact     Reflexes:    Left Right   Patella Tendon 1+ 1+   Achilles Tendon 1+ 1+   Clonus (--) (--)     REPEATED TEST MOVEMENTS: 1/10   Repeated Extension in Standing end range pain  no effect inc ROM   Repeated Flexion in lying no effect   Repeated Extension in lying  end range pain  no effect       STATIC TESTS   Lying prone  No effect    Prone  "on elbows No effect    Sitting slouched  no effect   Sitting erect better   Lying prone in extension  no effect         Treatment   Treatment Time In: 2:45 pm  Treatment Time Out: 3:00 pm   Total Treatment time separate from Evaluation: 15 minutes      Derick received therapeutic exercises to develop/improve posture, lumbarl ROM, strength and muscular endurance for 15 minutes including the following exercises:   HEP demonstrate include:  LTR x 10   Open Books x 5 B  PPT x 5 cue for dec BLE use  HS stretch   BONY -> press ups x 10   EIS x 10       Written Home Exercises Provided: yes.  Exercises were reviewed and Derick was able to demonstrate them prior to the end of the session.  Derick demonstrated fair  understanding of the education provided.     See EMR under Patient Instructions for exercises provided 6/26/2023.    Education provided:   - Patient received education regarding proper posture and body mechanics.  Patient was given [facility] [place] handout which discusses  back education, care specifically for posture seated, standing, lifting correctly, components of exercise, tips for back pain management, positioning and  importance of sleep.   Information on lumbar rolls provided.  - Cindy roll tried, and given to patient along with "Treat your Own Back Book" by Cindy        Assessment   Derick is a 58 y.o. male referred to [facility] with a medical diagnosis of M51.36 (ICD-10-CM) - DDD (degenerative disc disease), lumbar, M54.16 (ICD-10-CM) - Lumbar radiculopathy.   Pt presents with reported LBP and LLE discomfort  that is reproduced /c extended standing leading to dec functional mobility, strength and activity tolerance.  Although patient note sx reduced with sitting, repeated motions testing indicating possible directional preference of extension.  HEP present /c repeated extensions primarily to combat observed postural deficits. Upon physical assessment, pt demonstrates slouched posturing in sitting, " mild hip weakness bilaterally, poor posture and trunk and hip mobility deficits. Pt would benefit from LE and trunk mobility training, stability training,  improved cardiovascular and muscular endurance, neuromuscular re-education for posture, coordination, and muscular recruitment and education on positional offloading techniques to decrease the intensity and frequency of flare-ups.  Patient limited in PT participation due to living out of region.  Tx today also include education on temporary nature of injection and TENS and encouraged to be proactive /c his HEP to prepare body for ADLs.       Pain Pattern: 1PEP        Pt prognosis is Good.     Patient was seen in therapy as part of [facility] [place] [place]  [facility] Excellence Program.    Patient was given back care educational information verbally and in writing.  A lumbar roll and Cindy Treat your own Back book were provided.   The patient was given a home exercise program to continue with independently and was able to perform exercises in the clinic by the end of the treatment session today.      Plan of care discussed with patient: Yes  Pt's spiritual, cultural and educational needs considered and patient is agreeable to the plan of care and goals as stated below:     Anticipated Barriers for therapy: does not live in the area     PT Evaluation Completed? Yes    Medical necessity is demonstrated by the following problem list.    Pt presents with the following impairments:     History  Co-morbidities and personal factors that may impact the plan of care Co-morbidities:   coping style/mechanism, difficulty sleeping, and level of undertstanding of current condition    Personal Factors:   coping style     low   Examination  Body Structures and Functions, activity limitations and participation restrictions that may impact the plan of care Body Regions:   back  lower extremities    Body Systems:    ROM  strength  gross coordinated movement  transitions  motor control    Participation  "Restrictions:   Patient seen 1 visit as part of [facility] program    Activity limitations:   Learning and applying knowledge  no deficits    General Tasks and Commands  no deficits    Communication  no deficits    Mobility  lifting and carrying objects  walking    Self care  no deficits    Domestic Life  shopping  cooking  doing house work (cleaning house, washing dishes, laundry)    Interactions/Relationships  no deficits    Life Areas  no deficits    Community and Social Life  community life  recreation and leisure         low   Clinical Presentation stable and uncomplicated low   Decision Making/ Complexity Score: low       GOALS: Pt is in agreement with the following goals.    Short term goals:    Provide educational information to patient regarding back care education for posture, lifting, sleeping, activities of daily living  Provide a home exercise program that the patient is able to perform independently to continue to work on functional goals  Provide a walking program for continued health and wellness    Plan   Outpatient physical therapy 2x week for 1-2 weeks if patient is available to participate to include the following:   - Patient education  - Therapeutic exercise  - Manual therapy  - Therapeutic activity       Therapist: Reji Qureshi, PT  7/7/2023    "I certify the need for these services furnished under this plan of treatment and while under my care."    ____________________________________  Physician/Referring Practitioner    _______________  Date of Signature          "

## 2023-07-08 NOTE — PATIENT INSTRUCTIONS
WALKING PROGRAM      An apple a day keeps the Doctor away, could be replaced with, A walk a day keeps the Doctor away!  What is not to love about one of the simplest things you can do for your health?   Walking, as a form of exercise, has been shown to have numerous positive benefits.   Walking is also free, can be done anytime and anywhere, needs no special skill or equipment, and can be done at whatever level of fitness you are currently at.      Research has shown that the benefits of walking for at least 30 minutes a day may be effective to:  Improve blood pressure and blood sugar levels  Improve blood lipid profile  Improve memory and concentration  Improve sleep habits  Enhance mental wellbeing, improving mood and reducing depression  Reduce the risk of coronary heart disease ( the number 1 killer in the [place])  Reduce the risk of osteoporosis  Reduce the risk of breast and colon cancer  Reduce the risk of non-insulin dependent (type 2) diabetes  Reduce body weight and lower the risk of obesity      You need a few essentials before you hit the road.    Walking shoes.   Ensure you have lightweight, breathable, and supportive footwear.      Clothing.  Dress for comfort and the weather.  Wear layers when it is colder.  Remember sunscreen.  Water.  Take frequent sips of water when while you walk.  Ensure you drink before and after your walk.            GETTING STARTED:   Just start walking for 10 minutes, 4 times a week.  If this is very easy for you, start walking 20 minutes, 4 times a week.  Thats it!?  Yes, thats it!  Just walk out the door.  Watch your posture.  Walk tall.   Pretend you are being pulled up by a rope attached to the crown of your head.  Your shoulders should be down, back and relaxed.  Tighten your abdominal muscles and buttock, and fall into a natural stride.  Feel the natural swing of your arms in opposition to your leg swing.  Let the heel of each  foot gently touch the ground and feel the toes of each foot leave the ground last.  Be sure to drink plenty of water before, during, and after walking.   Incorporate a warm up and cool down into your routine.  Start your walk at a slow warm up pace, then walk desired length of time.  End your walk with slower cool down.  Any stretches that your therapist has recommended for you may be done before and after your walk to prevent injury.  The hardest part of starting a fitness program can be developing the habit.  Walking regularly will help (a minimum of 3 days a week is a good goal).  Developing a routine, walking partners, and keeping a journal are ways to help keep your motivation up.  Wearing a pedometer or using an volodymyr that records your steps, are motivational as well, and shown by research to increase your activity level.    PROGRESSING:  Once you are walking 4 times a week on a regular basis, you can progress your program by adding 5 minutes to each of your walks that week.   Set a time goal to achieve.   Every week add 5 minutes to your walk.   If your goal is 40 minutes, add 5 minutes weekly until you are comfortably walking 40 minutes 4 times a week.  Once you have achieved your time goal, you can further progress your program by increasing the speed at which you walk.  Dont forget to warm up and cool down as you start walking at a brisker pace.

## 2023-07-21 ENCOUNTER — PATIENT MESSAGE (OUTPATIENT)
Dept: SPINE | Facility: CLINIC | Age: 58
End: 2023-07-21
Payer: COMMERCIAL

## 2023-07-24 ENCOUNTER — PATIENT MESSAGE (OUTPATIENT)
Dept: RESEARCH | Facility: HOSPITAL | Age: 58
End: 2023-07-24
Payer: COMMERCIAL

## 2023-07-31 ENCOUNTER — PATIENT MESSAGE (OUTPATIENT)
Dept: RESEARCH | Facility: HOSPITAL | Age: 58
End: 2023-07-31
Payer: COMMERCIAL

## (undated) DEVICE — DRESSING LEUKOPLAST FLEX 1X3IN